# Patient Record
Sex: FEMALE | Race: WHITE
[De-identification: names, ages, dates, MRNs, and addresses within clinical notes are randomized per-mention and may not be internally consistent; named-entity substitution may affect disease eponyms.]

---

## 2018-01-09 ENCOUNTER — HOSPITAL ENCOUNTER (OUTPATIENT)
Dept: HOSPITAL 95 - LAB | Age: 83
Discharge: HOME | End: 2018-01-09
Attending: DERMATOLOGY
Payer: MEDICARE

## 2018-01-09 DIAGNOSIS — L03.116: Primary | ICD-10-CM

## 2018-02-02 ENCOUNTER — HOSPITAL ENCOUNTER (OUTPATIENT)
Dept: HOSPITAL 95 - WOUND | Age: 83
Discharge: HOME | End: 2018-02-02
Attending: SURGERY
Payer: MEDICARE

## 2018-02-02 DIAGNOSIS — I87.002: ICD-10-CM

## 2018-02-02 DIAGNOSIS — I87.2: ICD-10-CM

## 2018-02-02 DIAGNOSIS — I48.0: ICD-10-CM

## 2018-02-02 DIAGNOSIS — I10: ICD-10-CM

## 2018-02-02 DIAGNOSIS — L97.223: Primary | ICD-10-CM

## 2018-02-02 PROCEDURE — G0463 HOSPITAL OUTPT CLINIC VISIT: HCPCS

## 2018-02-02 PROCEDURE — 0KBT0ZZ EXCISION OF LEFT LOWER LEG MUSCLE, OPEN APPROACH: ICD-10-PCS | Performed by: SURGERY

## 2018-02-08 ENCOUNTER — HOSPITAL ENCOUNTER (OUTPATIENT)
Dept: HOSPITAL 95 - WOUND | Age: 83
Discharge: HOME | End: 2018-02-08
Attending: EMERGENCY MEDICINE
Payer: MEDICARE

## 2018-02-08 VITALS — HEIGHT: 70 IN | BODY MASS INDEX: 29.71 KG/M2 | WEIGHT: 207.5 LBS

## 2018-02-08 DIAGNOSIS — Z79.01: ICD-10-CM

## 2018-02-08 DIAGNOSIS — I87.002: ICD-10-CM

## 2018-02-08 DIAGNOSIS — Z86.718: ICD-10-CM

## 2018-02-08 DIAGNOSIS — Z48.00: Primary | ICD-10-CM

## 2018-02-08 DIAGNOSIS — I10: ICD-10-CM

## 2018-02-08 DIAGNOSIS — L97.223: ICD-10-CM

## 2018-02-08 DIAGNOSIS — I87.2: ICD-10-CM

## 2018-02-08 DIAGNOSIS — I48.0: ICD-10-CM

## 2018-02-08 DIAGNOSIS — F32.9: ICD-10-CM

## 2018-02-08 PROCEDURE — G0463 HOSPITAL OUTPT CLINIC VISIT: HCPCS

## 2018-02-12 ENCOUNTER — HOSPITAL ENCOUNTER (OUTPATIENT)
Dept: HOSPITAL 95 - WOUND | Age: 83
Discharge: HOME | End: 2018-02-12
Attending: ORTHOPAEDIC SURGERY
Payer: MEDICARE

## 2018-02-12 DIAGNOSIS — L97.223: ICD-10-CM

## 2018-02-12 DIAGNOSIS — I48.0: ICD-10-CM

## 2018-02-12 DIAGNOSIS — Z48.00: Primary | ICD-10-CM

## 2018-02-12 DIAGNOSIS — I10: ICD-10-CM

## 2018-02-12 DIAGNOSIS — I87.002: ICD-10-CM

## 2018-02-12 DIAGNOSIS — I87.2: ICD-10-CM

## 2018-02-12 PROCEDURE — 0HBLXZZ EXCISION OF LEFT LOWER LEG SKIN, EXTERNAL APPROACH: ICD-10-PCS | Performed by: ORTHOPAEDIC SURGERY

## 2018-02-21 ENCOUNTER — HOSPITAL ENCOUNTER (OUTPATIENT)
Dept: HOSPITAL 95 - WOUND | Age: 83
Discharge: HOME | End: 2018-02-21
Attending: SURGERY
Payer: MEDICARE

## 2018-02-21 DIAGNOSIS — I87.2: ICD-10-CM

## 2018-02-21 DIAGNOSIS — I87.002: ICD-10-CM

## 2018-02-21 DIAGNOSIS — Z48.00: Primary | ICD-10-CM

## 2018-02-21 DIAGNOSIS — I10: ICD-10-CM

## 2018-02-21 DIAGNOSIS — L97.223: ICD-10-CM

## 2018-02-21 DIAGNOSIS — I48.0: ICD-10-CM

## 2018-02-21 PROCEDURE — 2W1RX6Z COMPRESSION OF LEFT LOWER LEG USING PRESSURE DRESSING: ICD-10-PCS | Performed by: SURGERY

## 2018-02-21 PROCEDURE — 0HBLXZZ EXCISION OF LEFT LOWER LEG SKIN, EXTERNAL APPROACH: ICD-10-PCS | Performed by: SURGERY

## 2018-02-21 PROCEDURE — G0463 HOSPITAL OUTPT CLINIC VISIT: HCPCS

## 2018-02-27 ENCOUNTER — HOSPITAL ENCOUNTER (OUTPATIENT)
Dept: HOSPITAL 95 - LAB SHORT | Age: 83
Discharge: HOME | End: 2018-02-27
Payer: MEDICARE

## 2018-02-27 DIAGNOSIS — I87.2: Primary | ICD-10-CM

## 2018-02-27 DIAGNOSIS — L97.223: ICD-10-CM

## 2018-02-28 ENCOUNTER — HOSPITAL ENCOUNTER (OUTPATIENT)
Dept: HOSPITAL 95 - WOUND | Age: 83
Discharge: HOME | End: 2018-02-28
Attending: SURGERY
Payer: MEDICARE

## 2018-02-28 DIAGNOSIS — I10: ICD-10-CM

## 2018-02-28 DIAGNOSIS — I87.002: ICD-10-CM

## 2018-02-28 DIAGNOSIS — I87.2: ICD-10-CM

## 2018-02-28 DIAGNOSIS — Z79.01: ICD-10-CM

## 2018-02-28 DIAGNOSIS — I48.0: ICD-10-CM

## 2018-02-28 DIAGNOSIS — L97.223: Primary | ICD-10-CM

## 2018-02-28 PROCEDURE — 0HBLXZZ EXCISION OF LEFT LOWER LEG SKIN, EXTERNAL APPROACH: ICD-10-PCS | Performed by: SURGERY

## 2018-02-28 PROCEDURE — G0463 HOSPITAL OUTPT CLINIC VISIT: HCPCS

## 2018-03-07 ENCOUNTER — HOSPITAL ENCOUNTER (OUTPATIENT)
Dept: HOSPITAL 95 - WOUND | Age: 83
Discharge: HOME | End: 2018-03-07
Attending: SURGERY
Payer: MEDICARE

## 2018-03-07 DIAGNOSIS — I87.2: Primary | ICD-10-CM

## 2018-03-07 DIAGNOSIS — L97.223: ICD-10-CM

## 2018-03-07 DIAGNOSIS — Z79.01: ICD-10-CM

## 2018-03-07 DIAGNOSIS — I10: ICD-10-CM

## 2018-03-07 DIAGNOSIS — I87.002: ICD-10-CM

## 2018-03-07 DIAGNOSIS — I48.0: ICD-10-CM

## 2018-03-07 PROCEDURE — 2W1RX6Z COMPRESSION OF LEFT LOWER LEG USING PRESSURE DRESSING: ICD-10-PCS | Performed by: SURGERY

## 2018-03-14 ENCOUNTER — HOSPITAL ENCOUNTER (OUTPATIENT)
Dept: HOSPITAL 95 - WOUND | Age: 83
Discharge: HOME | End: 2018-03-14
Attending: SURGERY
Payer: MEDICARE

## 2018-03-14 DIAGNOSIS — Z86.718: ICD-10-CM

## 2018-03-14 DIAGNOSIS — I10: ICD-10-CM

## 2018-03-14 DIAGNOSIS — Z79.01: ICD-10-CM

## 2018-03-14 DIAGNOSIS — L97.223: ICD-10-CM

## 2018-03-14 DIAGNOSIS — I87.002: ICD-10-CM

## 2018-03-14 DIAGNOSIS — I87.2: Primary | ICD-10-CM

## 2018-03-14 DIAGNOSIS — I48.0: ICD-10-CM

## 2018-03-14 PROCEDURE — 2W1RX6Z COMPRESSION OF LEFT LOWER LEG USING PRESSURE DRESSING: ICD-10-PCS | Performed by: SURGERY

## 2018-03-23 ENCOUNTER — HOSPITAL ENCOUNTER (OUTPATIENT)
Dept: HOSPITAL 95 - WOUND | Age: 83
Discharge: HOME | End: 2018-03-23
Attending: SURGERY
Payer: MEDICARE

## 2018-03-23 DIAGNOSIS — I87.2: ICD-10-CM

## 2018-03-23 DIAGNOSIS — I10: ICD-10-CM

## 2018-03-23 DIAGNOSIS — I87.002: ICD-10-CM

## 2018-03-23 DIAGNOSIS — L97.822: ICD-10-CM

## 2018-03-23 DIAGNOSIS — I82.512: ICD-10-CM

## 2018-03-23 DIAGNOSIS — Z90.710: ICD-10-CM

## 2018-03-23 DIAGNOSIS — I48.0: ICD-10-CM

## 2018-03-23 DIAGNOSIS — F32.9: ICD-10-CM

## 2018-03-23 DIAGNOSIS — Z79.01: ICD-10-CM

## 2018-03-23 DIAGNOSIS — Z48.00: Primary | ICD-10-CM

## 2018-03-23 PROCEDURE — 0JBP0ZZ EXCISION OF LEFT LOWER LEG SUBCUTANEOUS TISSUE AND FASCIA, OPEN APPROACH: ICD-10-PCS | Performed by: ORTHOPAEDIC SURGERY

## 2018-03-28 ENCOUNTER — HOSPITAL ENCOUNTER (OUTPATIENT)
Dept: HOSPITAL 95 - WOUND | Age: 83
Discharge: HOME | End: 2018-03-28
Attending: SURGERY
Payer: MEDICARE

## 2018-03-28 DIAGNOSIS — I48.0: ICD-10-CM

## 2018-03-28 DIAGNOSIS — I10: ICD-10-CM

## 2018-03-28 DIAGNOSIS — F32.9: ICD-10-CM

## 2018-03-28 DIAGNOSIS — I87.2: Primary | ICD-10-CM

## 2018-03-28 DIAGNOSIS — L97.223: ICD-10-CM

## 2018-03-28 DIAGNOSIS — Z86.718: ICD-10-CM

## 2018-03-28 DIAGNOSIS — I87.002: ICD-10-CM

## 2018-03-28 DIAGNOSIS — F03.90: ICD-10-CM

## 2018-03-28 DIAGNOSIS — Z79.01: ICD-10-CM

## 2018-03-28 PROCEDURE — G0463 HOSPITAL OUTPT CLINIC VISIT: HCPCS

## 2018-03-28 PROCEDURE — 0HBLXZZ EXCISION OF LEFT LOWER LEG SKIN, EXTERNAL APPROACH: ICD-10-PCS | Performed by: SURGERY

## 2018-03-31 ENCOUNTER — HOSPITAL ENCOUNTER (OUTPATIENT)
Dept: HOSPITAL 95 - ER | Age: 83
Setting detail: OBSERVATION
LOS: 2 days | Discharge: HOME HEALTH | End: 2018-04-02
Attending: HOSPITALIST | Admitting: HOSPITALIST
Payer: MEDICARE

## 2018-03-31 VITALS — WEIGHT: 198.42 LBS | HEIGHT: 70 IN | BODY MASS INDEX: 28.41 KG/M2

## 2018-03-31 DIAGNOSIS — Z88.8: ICD-10-CM

## 2018-03-31 DIAGNOSIS — Z88.1: ICD-10-CM

## 2018-03-31 DIAGNOSIS — Z88.0: ICD-10-CM

## 2018-03-31 DIAGNOSIS — F41.9: ICD-10-CM

## 2018-03-31 DIAGNOSIS — S80.12XA: ICD-10-CM

## 2018-03-31 DIAGNOSIS — Z88.6: ICD-10-CM

## 2018-03-31 DIAGNOSIS — Z79.899: ICD-10-CM
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
## 2018-03-31 DIAGNOSIS — E03.9: ICD-10-CM

## 2018-03-31 DIAGNOSIS — F03.90: ICD-10-CM

## 2018-03-31 DIAGNOSIS — S70.02XA: Primary | ICD-10-CM

## 2018-03-31 DIAGNOSIS — L97.329: ICD-10-CM

## 2018-03-31 DIAGNOSIS — Z88.2: ICD-10-CM

## 2018-03-31 DIAGNOSIS — I87.2: ICD-10-CM

## 2018-03-31 DIAGNOSIS — S30.0XXA: ICD-10-CM

## 2018-03-31 DIAGNOSIS — Z91.041: ICD-10-CM

## 2018-03-31 DIAGNOSIS — Z79.01: ICD-10-CM

## 2018-03-31 DIAGNOSIS — R41.9: ICD-10-CM

## 2018-03-31 PROCEDURE — G8988 SELF CARE GOAL STATUS: HCPCS

## 2018-03-31 PROCEDURE — G8978 MOBILITY CURRENT STATUS: HCPCS

## 2018-03-31 PROCEDURE — G8979 MOBILITY GOAL STATUS: HCPCS

## 2018-03-31 PROCEDURE — G0378 HOSPITAL OBSERVATION PER HR: HCPCS

## 2018-03-31 PROCEDURE — G8987 SELF CARE CURRENT STATUS: HCPCS

## 2018-04-01 LAB
ALBUMIN SERPL BCP-MCNC: 3.2 G/DL (ref 3.4–5)
ALBUMIN/GLOB SERPL: 0.9 {RATIO} (ref 0.8–1.8)
ALT SERPL W P-5'-P-CCNC: 19 U/L (ref 12–78)
ANION GAP SERPL CALCULATED.4IONS-SCNC: 7 MMOL/L (ref 6–16)
AST SERPL W P-5'-P-CCNC: 16 U/L (ref 12–37)
BASOPHILS # BLD AUTO: 0.02 K/MM3 (ref 0–0.23)
BASOPHILS NFR BLD AUTO: 0 % (ref 0–2)
BILIRUB SERPL-MCNC: 0.4 MG/DL (ref 0.1–1)
BUN SERPL-MCNC: 21 MG/DL (ref 8–24)
CALCIUM SERPL-MCNC: 8 MG/DL (ref 8.5–10.1)
CHLORIDE SERPL-SCNC: 107 MMOL/L (ref 98–108)
CO2 SERPL-SCNC: 28 MMOL/L (ref 21–32)
CREAT SERPL-MCNC: 0.64 MG/DL (ref 0.4–1)
DEPRECATED RDW RBC AUTO: 47.4 FL (ref 35.1–46.3)
EOSINOPHIL # BLD AUTO: 0.23 K/MM3 (ref 0–0.68)
EOSINOPHIL NFR BLD AUTO: 5 % (ref 0–6)
ERYTHROCYTE [DISTWIDTH] IN BLOOD BY AUTOMATED COUNT: 14.1 % (ref 11.7–14.2)
GLOBULIN SER CALC-MCNC: 3.4 G/DL (ref 2.2–4)
GLUCOSE SERPL-MCNC: 88 MG/DL (ref 70–99)
HCT VFR BLD AUTO: 32.1 % (ref 33–51)
HGB BLD-MCNC: 10.1 G/DL (ref 11.5–16)
IMM GRANULOCYTES # BLD AUTO: 0.01 K/MM3 (ref 0–0.1)
IMM GRANULOCYTES NFR BLD AUTO: 0 % (ref 0–1)
LYMPHOCYTES # BLD AUTO: 1.25 K/MM3 (ref 0.84–5.2)
LYMPHOCYTES NFR BLD AUTO: 26 % (ref 21–46)
MCHC RBC AUTO-ENTMCNC: 31.5 G/DL (ref 31.5–36.5)
MCV RBC AUTO: 91 FL (ref 80–100)
MONOCYTES # BLD AUTO: 0.41 K/MM3 (ref 0.16–1.47)
MONOCYTES NFR BLD AUTO: 9 % (ref 4–13)
NEUTROPHILS # BLD AUTO: 2.83 K/MM3 (ref 1.96–9.15)
NEUTROPHILS NFR BLD AUTO: 60 % (ref 41–73)
NRBC # BLD AUTO: 0 K/MM3 (ref 0–0.02)
NRBC BLD AUTO-RTO: 0 /100 WBC (ref 0–0.2)
PLATELET # BLD AUTO: 200 K/MM3 (ref 150–400)
POTASSIUM SERPL-SCNC: 3.7 MMOL/L (ref 3.5–5.5)
PROT SERPL-MCNC: 6.6 G/DL (ref 6.4–8.2)
PROTHROMBIN TIME: 19.1 SEC (ref 9.7–11.5)
RBC #/AREA URNS HPF: (no result) /HPF (ref 0–2)
SODIUM SERPL-SCNC: 142 MMOL/L (ref 136–145)
SP GR SPEC: 1.02 (ref 1–1.02)
UROBILINOGEN UR STRIP-MCNC: (no result) MG/DL

## 2018-04-02 LAB
ANION GAP SERPL CALCULATED.4IONS-SCNC: 6 MMOL/L (ref 6–16)
BASOPHILS # BLD AUTO: 0.02 K/MM3 (ref 0–0.23)
BASOPHILS NFR BLD AUTO: 1 % (ref 0–2)
BUN SERPL-MCNC: 30 MG/DL (ref 8–24)
CALCIUM SERPL-MCNC: 8.1 MG/DL (ref 8.5–10.1)
CHLORIDE SERPL-SCNC: 107 MMOL/L (ref 98–108)
CO2 SERPL-SCNC: 28 MMOL/L (ref 21–32)
CREAT SERPL-MCNC: 0.85 MG/DL (ref 0.4–1)
DEPRECATED RDW RBC AUTO: 46.9 FL (ref 35.1–46.3)
EOSINOPHIL # BLD AUTO: 0.24 K/MM3 (ref 0–0.68)
EOSINOPHIL NFR BLD AUTO: 6 % (ref 0–6)
ERYTHROCYTE [DISTWIDTH] IN BLOOD BY AUTOMATED COUNT: 14.1 % (ref 11.7–14.2)
GLUCOSE SERPL-MCNC: 103 MG/DL (ref 70–99)
HCT VFR BLD AUTO: 32.4 % (ref 33–51)
HGB BLD-MCNC: 10.4 G/DL (ref 11.5–16)
IMM GRANULOCYTES # BLD AUTO: 0.01 K/MM3 (ref 0–0.1)
IMM GRANULOCYTES NFR BLD AUTO: 0 % (ref 0–1)
LYMPHOCYTES # BLD AUTO: 0.92 K/MM3 (ref 0.84–5.2)
LYMPHOCYTES NFR BLD AUTO: 22 % (ref 21–46)
MCHC RBC AUTO-ENTMCNC: 32.1 G/DL (ref 31.5–36.5)
MCV RBC AUTO: 91 FL (ref 80–100)
MONOCYTES # BLD AUTO: 0.37 K/MM3 (ref 0.16–1.47)
MONOCYTES NFR BLD AUTO: 9 % (ref 4–13)
NEUTROPHILS # BLD AUTO: 2.57 K/MM3 (ref 1.96–9.15)
NEUTROPHILS NFR BLD AUTO: 62 % (ref 41–73)
NRBC # BLD AUTO: 0 K/MM3 (ref 0–0.02)
NRBC BLD AUTO-RTO: 0 /100 WBC (ref 0–0.2)
PLATELET # BLD AUTO: 191 K/MM3 (ref 150–400)
POTASSIUM SERPL-SCNC: 4 MMOL/L (ref 3.5–5.5)
PROTHROMBIN TIME: 18.7 SEC (ref 9.7–11.5)
SODIUM SERPL-SCNC: 141 MMOL/L (ref 136–145)

## 2018-04-04 ENCOUNTER — HOSPITAL ENCOUNTER (OUTPATIENT)
Dept: HOSPITAL 95 - WOUND | Age: 83
Discharge: HOME | End: 2018-04-04
Attending: SURGERY
Payer: MEDICARE

## 2018-04-04 DIAGNOSIS — Z79.01: ICD-10-CM

## 2018-04-04 DIAGNOSIS — I87.002: ICD-10-CM

## 2018-04-04 DIAGNOSIS — I10: ICD-10-CM

## 2018-04-04 DIAGNOSIS — Z48.00: Primary | ICD-10-CM

## 2018-04-04 DIAGNOSIS — I87.2: ICD-10-CM

## 2018-04-04 DIAGNOSIS — I48.0: ICD-10-CM

## 2018-04-04 DIAGNOSIS — L97.223: ICD-10-CM

## 2018-04-04 PROCEDURE — G0463 HOSPITAL OUTPT CLINIC VISIT: HCPCS

## 2018-04-09 ENCOUNTER — HOSPITAL ENCOUNTER (OUTPATIENT)
Dept: HOSPITAL 95 - WOUND | Age: 83
Discharge: HOME | End: 2018-04-09
Attending: ORTHOPAEDIC SURGERY
Payer: MEDICARE

## 2018-04-09 ENCOUNTER — HOSPITAL ENCOUNTER (EMERGENCY)
Dept: HOSPITAL 95 - ER | Age: 83
Discharge: HOME | End: 2018-04-09
Payer: MEDICARE

## 2018-04-09 VITALS — HEIGHT: 70 IN | WEIGHT: 200 LBS | BODY MASS INDEX: 28.63 KG/M2

## 2018-04-09 DIAGNOSIS — Z79.899: ICD-10-CM

## 2018-04-09 DIAGNOSIS — I87.002: ICD-10-CM

## 2018-04-09 DIAGNOSIS — I87.2: Primary | ICD-10-CM

## 2018-04-09 DIAGNOSIS — I10: ICD-10-CM

## 2018-04-09 DIAGNOSIS — Z79.01: ICD-10-CM

## 2018-04-09 DIAGNOSIS — T14.8XXA: Primary | ICD-10-CM

## 2018-04-09 DIAGNOSIS — Z88.0: ICD-10-CM
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
## 2018-04-09 DIAGNOSIS — Z86.718: ICD-10-CM

## 2018-04-09 DIAGNOSIS — I48.0: ICD-10-CM

## 2018-04-09 DIAGNOSIS — Z88.6: ICD-10-CM

## 2018-04-09 DIAGNOSIS — Z88.2: ICD-10-CM

## 2018-04-09 DIAGNOSIS — L97.223: ICD-10-CM

## 2018-04-09 DIAGNOSIS — Z88.8: ICD-10-CM

## 2018-04-09 DIAGNOSIS — Z88.1: ICD-10-CM

## 2018-04-09 DIAGNOSIS — L97.329: ICD-10-CM

## 2018-04-09 DIAGNOSIS — I48.91: ICD-10-CM

## 2018-04-09 PROCEDURE — 2W1MX6Z COMPRESSION OF LEFT LOWER EXTREMITY USING PRESSURE DRESSING: ICD-10-PCS | Performed by: ORTHOPAEDIC SURGERY

## 2018-04-09 PROCEDURE — 0HBLXZZ EXCISION OF LEFT LOWER LEG SKIN, EXTERNAL APPROACH: ICD-10-PCS | Performed by: ORTHOPAEDIC SURGERY

## 2018-04-23 ENCOUNTER — HOSPITAL ENCOUNTER (OUTPATIENT)
Dept: HOSPITAL 95 - WOUND | Age: 83
Discharge: HOME | End: 2018-04-23
Attending: ORTHOPAEDIC SURGERY
Payer: MEDICARE

## 2018-04-23 DIAGNOSIS — I10: ICD-10-CM

## 2018-04-23 DIAGNOSIS — F32.9: ICD-10-CM

## 2018-04-23 DIAGNOSIS — I87.2: ICD-10-CM

## 2018-04-23 DIAGNOSIS — L97.223: ICD-10-CM

## 2018-04-23 DIAGNOSIS — Z79.02: ICD-10-CM

## 2018-04-23 DIAGNOSIS — L97.822: Primary | ICD-10-CM

## 2018-04-23 DIAGNOSIS — I87.002: ICD-10-CM

## 2018-04-23 DIAGNOSIS — I48.0: ICD-10-CM

## 2018-04-23 PROCEDURE — 2W1RX6Z COMPRESSION OF LEFT LOWER LEG USING PRESSURE DRESSING: ICD-10-PCS | Performed by: ORTHOPAEDIC SURGERY

## 2018-04-23 PROCEDURE — G0463 HOSPITAL OUTPT CLINIC VISIT: HCPCS

## 2018-04-30 ENCOUNTER — HOSPITAL ENCOUNTER (OUTPATIENT)
Dept: HOSPITAL 95 - WOUND | Age: 83
Discharge: HOME | End: 2018-04-30
Attending: ORTHOPAEDIC SURGERY
Payer: MEDICARE

## 2018-04-30 DIAGNOSIS — I87.2: ICD-10-CM

## 2018-04-30 DIAGNOSIS — L97.822: Primary | ICD-10-CM

## 2018-04-30 DIAGNOSIS — L97.223: ICD-10-CM

## 2018-04-30 DIAGNOSIS — I10: ICD-10-CM

## 2018-04-30 DIAGNOSIS — I48.0: ICD-10-CM

## 2018-04-30 DIAGNOSIS — Z79.01: ICD-10-CM

## 2018-04-30 PROCEDURE — 2W1RX6Z COMPRESSION OF LEFT LOWER LEG USING PRESSURE DRESSING: ICD-10-PCS | Performed by: ORTHOPAEDIC SURGERY

## 2018-04-30 PROCEDURE — G0463 HOSPITAL OUTPT CLINIC VISIT: HCPCS

## 2018-05-07 ENCOUNTER — HOSPITAL ENCOUNTER (OUTPATIENT)
Dept: HOSPITAL 95 - WOUND | Age: 83
Discharge: HOME | End: 2018-05-07
Attending: ORTHOPAEDIC SURGERY
Payer: MEDICARE

## 2018-05-07 DIAGNOSIS — I87.002: ICD-10-CM

## 2018-05-07 DIAGNOSIS — I87.2: Primary | ICD-10-CM

## 2018-05-07 DIAGNOSIS — I10: ICD-10-CM

## 2018-05-07 DIAGNOSIS — I48.0: ICD-10-CM

## 2018-05-07 DIAGNOSIS — Z79.01: ICD-10-CM

## 2018-05-07 DIAGNOSIS — L97.822: ICD-10-CM

## 2018-05-07 PROCEDURE — 0HBLXZZ EXCISION OF LEFT LOWER LEG SKIN, EXTERNAL APPROACH: ICD-10-PCS | Performed by: ORTHOPAEDIC SURGERY

## 2018-05-07 PROCEDURE — 2W1MX6Z COMPRESSION OF LEFT LOWER EXTREMITY USING PRESSURE DRESSING: ICD-10-PCS | Performed by: ORTHOPAEDIC SURGERY

## 2018-05-07 PROCEDURE — G0463 HOSPITAL OUTPT CLINIC VISIT: HCPCS

## 2018-05-14 ENCOUNTER — HOSPITAL ENCOUNTER (OUTPATIENT)
Dept: HOSPITAL 95 - WOUND | Age: 83
Discharge: HOME | End: 2018-05-14
Attending: ORTHOPAEDIC SURGERY
Payer: MEDICARE

## 2018-05-14 DIAGNOSIS — I10: ICD-10-CM

## 2018-05-14 DIAGNOSIS — I87.2: Primary | ICD-10-CM

## 2018-05-14 DIAGNOSIS — L97.822: ICD-10-CM

## 2018-05-14 DIAGNOSIS — I48.0: ICD-10-CM

## 2018-05-14 DIAGNOSIS — Z79.01: ICD-10-CM

## 2018-05-14 DIAGNOSIS — L97.223: ICD-10-CM

## 2018-05-14 PROCEDURE — G0463 HOSPITAL OUTPT CLINIC VISIT: HCPCS

## 2018-05-14 PROCEDURE — 2W1RX6Z COMPRESSION OF LEFT LOWER LEG USING PRESSURE DRESSING: ICD-10-PCS | Performed by: ORTHOPAEDIC SURGERY

## 2018-05-21 ENCOUNTER — HOSPITAL ENCOUNTER (OUTPATIENT)
Dept: HOSPITAL 95 - WOUND | Age: 83
Discharge: HOME | End: 2018-05-21
Attending: ORTHOPAEDIC SURGERY
Payer: MEDICARE

## 2018-05-21 DIAGNOSIS — I48.0: ICD-10-CM

## 2018-05-21 DIAGNOSIS — I10: ICD-10-CM

## 2018-05-21 DIAGNOSIS — Z79.01: ICD-10-CM

## 2018-05-21 DIAGNOSIS — F32.9: ICD-10-CM

## 2018-05-21 DIAGNOSIS — L97.822: Primary | ICD-10-CM

## 2018-05-21 PROCEDURE — 0HBLXZZ EXCISION OF LEFT LOWER LEG SKIN, EXTERNAL APPROACH: ICD-10-PCS | Performed by: ORTHOPAEDIC SURGERY

## 2018-06-04 ENCOUNTER — HOSPITAL ENCOUNTER (OUTPATIENT)
Dept: HOSPITAL 95 - WOUND | Age: 83
Discharge: HOME | End: 2018-06-04
Attending: ORTHOPAEDIC SURGERY
Payer: MEDICARE

## 2018-06-04 DIAGNOSIS — Z86.718: ICD-10-CM

## 2018-06-04 DIAGNOSIS — L97.822: Primary | ICD-10-CM

## 2018-06-04 DIAGNOSIS — I48.0: ICD-10-CM

## 2018-06-04 DIAGNOSIS — I10: ICD-10-CM

## 2018-06-04 DIAGNOSIS — I87.2: ICD-10-CM

## 2018-06-04 DIAGNOSIS — F32.9: ICD-10-CM

## 2018-06-04 DIAGNOSIS — Z79.01: ICD-10-CM

## 2018-06-04 PROCEDURE — 2W1RX6Z COMPRESSION OF LEFT LOWER LEG USING PRESSURE DRESSING: ICD-10-PCS | Performed by: ORTHOPAEDIC SURGERY

## 2018-06-04 PROCEDURE — 0HBLXZZ EXCISION OF LEFT LOWER LEG SKIN, EXTERNAL APPROACH: ICD-10-PCS | Performed by: ORTHOPAEDIC SURGERY

## 2018-06-04 PROCEDURE — G0463 HOSPITAL OUTPT CLINIC VISIT: HCPCS

## 2018-06-11 ENCOUNTER — HOSPITAL ENCOUNTER (OUTPATIENT)
Dept: HOSPITAL 95 - WOUND | Age: 83
Discharge: HOME | End: 2018-06-11
Attending: ORTHOPAEDIC SURGERY
Payer: MEDICARE

## 2018-06-11 DIAGNOSIS — L97.829: ICD-10-CM

## 2018-06-11 DIAGNOSIS — I10: ICD-10-CM

## 2018-06-11 DIAGNOSIS — I87.2: Primary | ICD-10-CM

## 2018-06-11 DIAGNOSIS — I48.0: ICD-10-CM

## 2018-06-11 DIAGNOSIS — I87.002: ICD-10-CM

## 2018-06-11 DIAGNOSIS — Z79.01: ICD-10-CM

## 2018-06-11 PROCEDURE — 0HBLXZZ EXCISION OF LEFT LOWER LEG SKIN, EXTERNAL APPROACH: ICD-10-PCS | Performed by: ORTHOPAEDIC SURGERY

## 2018-06-11 PROCEDURE — 2W1RX6Z COMPRESSION OF LEFT LOWER LEG USING PRESSURE DRESSING: ICD-10-PCS | Performed by: ORTHOPAEDIC SURGERY

## 2018-06-25 ENCOUNTER — HOSPITAL ENCOUNTER (OUTPATIENT)
Dept: HOSPITAL 95 - WOUND | Age: 83
Discharge: HOME | End: 2018-06-25
Attending: ORTHOPAEDIC SURGERY
Payer: MEDICARE

## 2018-06-25 DIAGNOSIS — I87.2: ICD-10-CM

## 2018-06-25 DIAGNOSIS — Z79.01: ICD-10-CM

## 2018-06-25 DIAGNOSIS — I10: ICD-10-CM

## 2018-06-25 DIAGNOSIS — I87.002: ICD-10-CM

## 2018-06-25 DIAGNOSIS — L97.822: Primary | ICD-10-CM

## 2018-06-25 DIAGNOSIS — F32.9: ICD-10-CM

## 2018-06-25 DIAGNOSIS — Z86.718: ICD-10-CM

## 2018-06-25 DIAGNOSIS — I48.0: ICD-10-CM

## 2018-06-25 PROCEDURE — 2W1MX6Z COMPRESSION OF LEFT LOWER EXTREMITY USING PRESSURE DRESSING: ICD-10-PCS | Performed by: ORTHOPAEDIC SURGERY

## 2018-06-25 PROCEDURE — 0HBLXZZ EXCISION OF LEFT LOWER LEG SKIN, EXTERNAL APPROACH: ICD-10-PCS | Performed by: ORTHOPAEDIC SURGERY

## 2018-07-09 ENCOUNTER — HOSPITAL ENCOUNTER (OUTPATIENT)
Dept: HOSPITAL 95 - WOUND | Age: 83
Discharge: HOME | End: 2018-07-09
Attending: ORTHOPAEDIC SURGERY
Payer: MEDICARE

## 2018-07-09 DIAGNOSIS — I48.0: ICD-10-CM

## 2018-07-09 DIAGNOSIS — Z79.01: ICD-10-CM

## 2018-07-09 DIAGNOSIS — L97.822: Primary | ICD-10-CM

## 2018-07-09 DIAGNOSIS — R60.0: ICD-10-CM

## 2018-07-09 DIAGNOSIS — F32.9: ICD-10-CM

## 2018-07-09 DIAGNOSIS — I10: ICD-10-CM

## 2018-07-09 DIAGNOSIS — L97.223: ICD-10-CM

## 2018-07-09 DIAGNOSIS — I87.2: ICD-10-CM

## 2018-07-09 DIAGNOSIS — L84: ICD-10-CM

## 2018-07-09 DIAGNOSIS — I87.002: ICD-10-CM

## 2018-07-09 DIAGNOSIS — Z86.718: ICD-10-CM

## 2018-07-09 PROCEDURE — G0463 HOSPITAL OUTPT CLINIC VISIT: HCPCS

## 2018-07-16 ENCOUNTER — HOSPITAL ENCOUNTER (OUTPATIENT)
Dept: HOSPITAL 95 - WOUND | Age: 83
Discharge: HOME | End: 2018-07-16
Attending: ORTHOPAEDIC SURGERY
Payer: MEDICARE

## 2018-07-16 DIAGNOSIS — Z48.00: Primary | ICD-10-CM

## 2018-07-16 DIAGNOSIS — I10: ICD-10-CM

## 2018-07-16 DIAGNOSIS — Z79.01: ICD-10-CM

## 2018-07-16 DIAGNOSIS — I87.2: ICD-10-CM

## 2018-07-16 DIAGNOSIS — F32.9: ICD-10-CM

## 2018-07-16 DIAGNOSIS — I48.0: ICD-10-CM

## 2018-07-16 DIAGNOSIS — I87.002: ICD-10-CM

## 2018-07-16 DIAGNOSIS — Z86.718: ICD-10-CM

## 2018-07-16 DIAGNOSIS — L97.822: ICD-10-CM

## 2018-07-16 PROCEDURE — 2W1RX6Z COMPRESSION OF LEFT LOWER LEG USING PRESSURE DRESSING: ICD-10-PCS | Performed by: ORTHOPAEDIC SURGERY

## 2018-07-16 PROCEDURE — G0463 HOSPITAL OUTPT CLINIC VISIT: HCPCS

## 2018-07-16 PROCEDURE — 0JBP0ZZ EXCISION OF LEFT LOWER LEG SUBCUTANEOUS TISSUE AND FASCIA, OPEN APPROACH: ICD-10-PCS | Performed by: ORTHOPAEDIC SURGERY

## 2018-07-18 ENCOUNTER — HOSPITAL ENCOUNTER (OUTPATIENT)
Dept: HOSPITAL 95 - WOUND | Age: 83
Discharge: HOME | End: 2018-07-18
Attending: ORTHOPAEDIC SURGERY
Payer: MEDICARE

## 2018-07-18 DIAGNOSIS — I10: ICD-10-CM

## 2018-07-18 DIAGNOSIS — L97.223: ICD-10-CM

## 2018-07-18 DIAGNOSIS — I87.2: Primary | ICD-10-CM

## 2018-07-18 DIAGNOSIS — I48.0: ICD-10-CM

## 2018-07-18 PROCEDURE — 2W1RX6Z COMPRESSION OF LEFT LOWER LEG USING PRESSURE DRESSING: ICD-10-PCS | Performed by: ORTHOPAEDIC SURGERY

## 2018-08-07 ENCOUNTER — HOSPITAL ENCOUNTER (OUTPATIENT)
Dept: HOSPITAL 95 - MHTC | Age: 83
Discharge: HOME | End: 2018-08-07
Attending: INTERNAL MEDICINE
Payer: MEDICARE

## 2018-08-07 VITALS — BODY MASS INDEX: 28.41 KG/M2 | WEIGHT: 198.42 LBS | HEIGHT: 70 IN

## 2018-08-07 DIAGNOSIS — I83.029: Primary | ICD-10-CM

## 2018-08-07 DIAGNOSIS — E03.9: ICD-10-CM

## 2018-08-07 DIAGNOSIS — L97.929: ICD-10-CM

## 2018-08-07 DIAGNOSIS — I87.2: ICD-10-CM

## 2018-08-07 PROCEDURE — 3E033TZ INTRODUCTION OF DESTRUCTIVE AGENT INTO PERIPHERAL VEIN, PERCUTANEOUS APPROACH: ICD-10-PCS | Performed by: INTERNAL MEDICINE

## 2018-08-22 ENCOUNTER — HOSPITAL ENCOUNTER (OUTPATIENT)
Dept: HOSPITAL 95 - LAB | Age: 83
End: 2018-08-22
Attending: INTERNAL MEDICINE
Payer: MEDICARE

## 2018-08-22 DIAGNOSIS — E78.5: Primary | ICD-10-CM

## 2018-08-22 DIAGNOSIS — R73.01: ICD-10-CM

## 2018-08-22 DIAGNOSIS — E03.9: ICD-10-CM

## 2018-08-22 DIAGNOSIS — E78.4: ICD-10-CM

## 2018-08-22 DIAGNOSIS — I48.2: ICD-10-CM

## 2018-08-22 DIAGNOSIS — E03.8: ICD-10-CM

## 2018-08-22 DIAGNOSIS — I10: ICD-10-CM

## 2018-08-22 LAB
ALBUMIN SERPL BCP-MCNC: 3.8 G/DL (ref 3.4–5)
ALBUMIN/GLOB SERPL: 1.2 {RATIO} (ref 0.8–1.8)
ALT SERPL W P-5'-P-CCNC: 19 U/L (ref 12–78)
ANION GAP SERPL CALCULATED.4IONS-SCNC: 7 MMOL/L (ref 6–16)
AST SERPL W P-5'-P-CCNC: 14 U/L (ref 12–37)
BASOPHILS # BLD AUTO: 0.01 K/MM3 (ref 0–0.23)
BASOPHILS NFR BLD AUTO: 0 % (ref 0–2)
BILIRUB SERPL-MCNC: 0.4 MG/DL (ref 0.1–1)
BUN SERPL-MCNC: 22 MG/DL (ref 8–24)
CALCIUM SERPL-MCNC: 8 MG/DL (ref 8.5–10.1)
CHLORIDE SERPL-SCNC: 106 MMOL/L (ref 98–108)
CHOLEST SERPL-MCNC: 149 MG/DL (ref 50–200)
CHOLEST/HDLC SERPL: 3.3 {RATIO}
CO2 SERPL-SCNC: 28 MMOL/L (ref 21–32)
CREAT SERPL-MCNC: 0.71 MG/DL (ref 0.4–1)
DEPRECATED RDW RBC AUTO: 47.4 FL (ref 35.1–46.3)
EOSINOPHIL # BLD AUTO: 0.21 K/MM3 (ref 0–0.68)
EOSINOPHIL NFR BLD AUTO: 5 % (ref 0–6)
ERYTHROCYTE [DISTWIDTH] IN BLOOD BY AUTOMATED COUNT: 13.9 % (ref 11.7–14.2)
GLOBULIN SER CALC-MCNC: 3.1 G/DL (ref 2.2–4)
GLUCOSE SERPL-MCNC: 89 MG/DL (ref 70–99)
HCT VFR BLD AUTO: 32.9 % (ref 33–51)
HDLC SERPL-MCNC: 45 MG/DL (ref 39–?)
HGB BLD-MCNC: 10.5 G/DL (ref 11.5–16)
IMM GRANULOCYTES # BLD AUTO: 0.01 K/MM3 (ref 0–0.1)
IMM GRANULOCYTES NFR BLD AUTO: 0 % (ref 0–1)
LDLC SERPL CALC-MCNC: 85 MG/DL (ref 0–110)
LDLC/HDLC SERPL: 1.9 {RATIO}
LYMPHOCYTES # BLD AUTO: 0.79 K/MM3 (ref 0.84–5.2)
LYMPHOCYTES NFR BLD AUTO: 20 % (ref 21–46)
MCHC RBC AUTO-ENTMCNC: 31.9 G/DL (ref 31.5–36.5)
MCV RBC AUTO: 94 FL (ref 80–100)
MONOCYTES # BLD AUTO: 0.25 K/MM3 (ref 0.16–1.47)
MONOCYTES NFR BLD AUTO: 6 % (ref 4–13)
NEUTROPHILS # BLD AUTO: 2.66 K/MM3 (ref 1.96–9.15)
NEUTROPHILS NFR BLD AUTO: 68 % (ref 41–73)
NRBC # BLD AUTO: 0 K/MM3 (ref 0–0.02)
NRBC BLD AUTO-RTO: 0 /100 WBC (ref 0–0.2)
PLATELET # BLD AUTO: 211 K/MM3 (ref 150–400)
POTASSIUM SERPL-SCNC: 3.4 MMOL/L (ref 3.5–5.5)
PROT SERPL-MCNC: 6.9 G/DL (ref 6.4–8.2)
SODIUM SERPL-SCNC: 141 MMOL/L (ref 136–145)
TRIGL SERPL-MCNC: 96 MG/DL (ref 30–160)
TSH SERPL DL<=0.005 MIU/L-ACNC: 2.93 UIU/ML (ref 0.36–4.8)
VLDLC SERPL CALC-MCNC: 19 MG/DL (ref 6–32)

## 2018-08-30 ENCOUNTER — HOSPITAL ENCOUNTER (EMERGENCY)
Dept: HOSPITAL 95 - ER | Age: 83
LOS: 1 days | Discharge: HOME | End: 2018-08-31
Payer: MEDICARE

## 2018-08-30 VITALS — WEIGHT: 180.01 LBS | HEIGHT: 64 IN | BODY MASS INDEX: 30.73 KG/M2

## 2018-08-30 DIAGNOSIS — Z88.0: ICD-10-CM

## 2018-08-30 DIAGNOSIS — L03.115: Primary | ICD-10-CM

## 2018-08-30 DIAGNOSIS — Z79.01: ICD-10-CM

## 2018-08-30 DIAGNOSIS — Z88.1: ICD-10-CM

## 2018-08-30 DIAGNOSIS — Z88.6: ICD-10-CM

## 2018-08-30 DIAGNOSIS — R79.1: ICD-10-CM

## 2018-08-30 DIAGNOSIS — I48.91: ICD-10-CM

## 2018-08-30 DIAGNOSIS — Z88.2: ICD-10-CM

## 2018-08-30 DIAGNOSIS — Z79.899: ICD-10-CM

## 2018-08-30 DIAGNOSIS — Z88.8: ICD-10-CM

## 2018-08-30 LAB
ANION GAP SERPL CALCULATED.4IONS-SCNC: 6 MMOL/L (ref 6–16)
BASOPHILS # BLD AUTO: 0.02 K/MM3 (ref 0–0.23)
BASOPHILS NFR BLD AUTO: 0 % (ref 0–2)
BUN SERPL-MCNC: 20 MG/DL (ref 8–24)
CALCIUM SERPL-MCNC: 8.4 MG/DL (ref 8.5–10.1)
CHLORIDE SERPL-SCNC: 104 MMOL/L (ref 98–108)
CO2 SERPL-SCNC: 30 MMOL/L (ref 21–32)
CREAT SERPL-MCNC: 0.87 MG/DL (ref 0.4–1)
DEPRECATED RDW RBC AUTO: 48.4 FL (ref 35.1–46.3)
EOSINOPHIL # BLD AUTO: 0.17 K/MM3 (ref 0–0.68)
EOSINOPHIL NFR BLD AUTO: 2 % (ref 0–6)
ERYTHROCYTE [DISTWIDTH] IN BLOOD BY AUTOMATED COUNT: 13.9 % (ref 11.7–14.2)
GLUCOSE SERPL-MCNC: 124 MG/DL (ref 70–99)
HCT VFR BLD AUTO: 34.3 % (ref 33–51)
HGB BLD-MCNC: 11 G/DL (ref 11.5–16)
IMM GRANULOCYTES # BLD AUTO: 0.02 K/MM3 (ref 0–0.1)
IMM GRANULOCYTES NFR BLD AUTO: 0 % (ref 0–1)
LYMPHOCYTES # BLD AUTO: 0.79 K/MM3 (ref 0.84–5.2)
LYMPHOCYTES NFR BLD AUTO: 11 % (ref 21–46)
MCHC RBC AUTO-ENTMCNC: 32.1 G/DL (ref 31.5–36.5)
MCV RBC AUTO: 95 FL (ref 80–100)
MONOCYTES # BLD AUTO: 0.54 K/MM3 (ref 0.16–1.47)
MONOCYTES NFR BLD AUTO: 7 % (ref 4–13)
NEUTROPHILS # BLD AUTO: 5.87 K/MM3 (ref 1.96–9.15)
NEUTROPHILS NFR BLD AUTO: 79 % (ref 41–73)
NRBC # BLD AUTO: 0 K/MM3 (ref 0–0.02)
NRBC BLD AUTO-RTO: 0 /100 WBC (ref 0–0.2)
PLATELET # BLD AUTO: 194 K/MM3 (ref 150–400)
POTASSIUM SERPL-SCNC: 3.9 MMOL/L (ref 3.5–5.5)
PROTHROMBIN TIME: 14.7 SEC (ref 9.7–11.5)
SODIUM SERPL-SCNC: 140 MMOL/L (ref 136–145)

## 2018-11-16 ENCOUNTER — HOSPITAL ENCOUNTER (OUTPATIENT)
Dept: HOSPITAL 95 - LAB EV | Age: 83
Discharge: HOME | End: 2018-11-16
Attending: PHYSICIAN ASSISTANT
Payer: MEDICARE

## 2018-11-16 DIAGNOSIS — R60.0: Primary | ICD-10-CM

## 2018-11-16 LAB
ALBUMIN SERPL BCP-MCNC: 4 G/DL (ref 3.4–5)
ALBUMIN/GLOB SERPL: 1.1 {RATIO} (ref 0.8–1.8)
ALT SERPL W P-5'-P-CCNC: 26 U/L (ref 12–78)
ANION GAP SERPL CALCULATED.4IONS-SCNC: 10 MMOL/L (ref 6–16)
AST SERPL W P-5'-P-CCNC: 17 U/L (ref 12–37)
BASOPHILS # BLD AUTO: 0.01 K/MM3 (ref 0–0.23)
BASOPHILS NFR BLD AUTO: 0 % (ref 0–2)
BILIRUB SERPL-MCNC: 0.8 MG/DL (ref 0.1–1)
BUN SERPL-MCNC: 27 MG/DL (ref 8–24)
CALCIUM SERPL-MCNC: 8.9 MG/DL (ref 8.5–10.1)
CHLORIDE SERPL-SCNC: 102 MMOL/L (ref 98–108)
CO2 SERPL-SCNC: 27 MMOL/L (ref 21–32)
CREAT SERPL-MCNC: 1.06 MG/DL (ref 0.4–1)
DEPRECATED RDW RBC AUTO: 51.7 FL (ref 35.1–46.3)
EOSINOPHIL # BLD AUTO: 0.17 K/MM3 (ref 0–0.68)
EOSINOPHIL NFR BLD AUTO: 2 % (ref 0–6)
ERYTHROCYTE [DISTWIDTH] IN BLOOD BY AUTOMATED COUNT: 15.2 % (ref 11.7–14.2)
GLOBULIN SER CALC-MCNC: 3.8 G/DL (ref 2.2–4)
GLUCOSE SERPL-MCNC: 116 MG/DL (ref 70–99)
HCT VFR BLD AUTO: 34.1 % (ref 33–51)
HGB BLD-MCNC: 11.1 G/DL (ref 11.5–16)
IMM GRANULOCYTES # BLD AUTO: 0.03 K/MM3 (ref 0–0.1)
IMM GRANULOCYTES NFR BLD AUTO: 0 % (ref 0–1)
LYMPHOCYTES # BLD AUTO: 0.73 K/MM3 (ref 0.84–5.2)
LYMPHOCYTES NFR BLD AUTO: 8 % (ref 21–46)
MCHC RBC AUTO-ENTMCNC: 32.6 G/DL (ref 31.5–36.5)
MCV RBC AUTO: 92 FL (ref 80–100)
MONOCYTES # BLD AUTO: 0.49 K/MM3 (ref 0.16–1.47)
MONOCYTES NFR BLD AUTO: 5 % (ref 4–13)
NEUTROPHILS # BLD AUTO: 7.78 K/MM3 (ref 1.96–9.15)
NEUTROPHILS NFR BLD AUTO: 85 % (ref 41–73)
NRBC # BLD AUTO: 0 K/MM3 (ref 0–0.02)
NRBC BLD AUTO-RTO: 0 /100 WBC (ref 0–0.2)
PLATELET # BLD AUTO: 211 K/MM3 (ref 150–400)
POTASSIUM SERPL-SCNC: 4.1 MMOL/L (ref 3.5–5.5)
PROT SERPL-MCNC: 7.8 G/DL (ref 6.4–8.2)
SODIUM SERPL-SCNC: 139 MMOL/L (ref 136–145)

## 2018-12-21 ENCOUNTER — HOSPITAL ENCOUNTER (EMERGENCY)
Dept: HOSPITAL 95 - ER | Age: 83
LOS: 1 days | Discharge: HOME | End: 2018-12-22
Payer: MEDICARE

## 2018-12-21 VITALS — WEIGHT: 206.99 LBS | BODY MASS INDEX: 29.63 KG/M2 | HEIGHT: 70 IN

## 2018-12-21 DIAGNOSIS — F41.9: Primary | ICD-10-CM

## 2018-12-21 DIAGNOSIS — I48.91: ICD-10-CM

## 2018-12-21 DIAGNOSIS — Z88.0: ICD-10-CM

## 2018-12-21 DIAGNOSIS — Z88.2: ICD-10-CM

## 2018-12-21 DIAGNOSIS — Z79.899: ICD-10-CM

## 2018-12-21 DIAGNOSIS — Z88.1: ICD-10-CM

## 2018-12-21 DIAGNOSIS — Z88.8: ICD-10-CM

## 2018-12-21 LAB
BASOPHILS # BLD AUTO: 0.02 K/MM3 (ref 0–0.23)
BASOPHILS NFR BLD AUTO: 0 % (ref 0–2)
DEPRECATED RDW RBC AUTO: 50.1 FL (ref 35.1–46.3)
EOSINOPHIL # BLD AUTO: 0.19 K/MM3 (ref 0–0.68)
EOSINOPHIL NFR BLD AUTO: 3 % (ref 0–6)
ERYTHROCYTE [DISTWIDTH] IN BLOOD BY AUTOMATED COUNT: 15 % (ref 11.7–14.2)
HCT VFR BLD AUTO: 36 % (ref 33–51)
HGB BLD-MCNC: 11.5 G/DL (ref 11.5–16)
IMM GRANULOCYTES # BLD AUTO: 0.01 K/MM3 (ref 0–0.1)
IMM GRANULOCYTES NFR BLD AUTO: 0 % (ref 0–1)
LYMPHOCYTES # BLD AUTO: 1 K/MM3 (ref 0.84–5.2)
LYMPHOCYTES NFR BLD AUTO: 16 % (ref 21–46)
MCHC RBC AUTO-ENTMCNC: 31.9 G/DL (ref 31.5–36.5)
MCV RBC AUTO: 91 FL (ref 80–100)
MONOCYTES # BLD AUTO: 0.32 K/MM3 (ref 0.16–1.47)
MONOCYTES NFR BLD AUTO: 5 % (ref 4–13)
NEUTROPHILS # BLD AUTO: 4.59 K/MM3 (ref 1.96–9.15)
NEUTROPHILS NFR BLD AUTO: 75 % (ref 41–73)
NRBC # BLD AUTO: 0 K/MM3 (ref 0–0.02)
NRBC BLD AUTO-RTO: 0 /100 WBC (ref 0–0.2)
PLATELET # BLD AUTO: 206 K/MM3 (ref 150–400)

## 2018-12-22 LAB
ALBUMIN SERPL BCP-MCNC: 4.1 G/DL (ref 3.4–5)
ALBUMIN/GLOB SERPL: 1.1 {RATIO} (ref 0.8–1.8)
ALT SERPL W P-5'-P-CCNC: 24 U/L (ref 12–78)
ANION GAP SERPL CALCULATED.4IONS-SCNC: 9 MMOL/L (ref 6–16)
AST SERPL W P-5'-P-CCNC: 18 U/L (ref 12–37)
BILIRUB SERPL-MCNC: 0.4 MG/DL (ref 0.1–1)
BUN SERPL-MCNC: 28 MG/DL (ref 8–24)
CALCIUM SERPL-MCNC: 8.9 MG/DL (ref 8.5–10.1)
CHLORIDE SERPL-SCNC: 103 MMOL/L (ref 98–108)
CO2 SERPL-SCNC: 26 MMOL/L (ref 21–32)
CREAT SERPL-MCNC: 1 MG/DL (ref 0.4–1)
GLOBULIN SER CALC-MCNC: 3.8 G/DL (ref 2.2–4)
GLUCOSE SERPL-MCNC: 135 MG/DL (ref 70–99)
POTASSIUM SERPL-SCNC: 3.8 MMOL/L (ref 3.5–5.5)
PROT SERPL-MCNC: 7.9 G/DL (ref 6.4–8.2)
SODIUM SERPL-SCNC: 138 MMOL/L (ref 136–145)
TROPONIN I SERPL-MCNC: <0.015 NG/ML (ref 0–0.04)

## 2019-03-12 ENCOUNTER — HOSPITAL ENCOUNTER (OUTPATIENT)
Dept: HOSPITAL 95 - MHTC | Age: 84
Discharge: HOME | End: 2019-03-12
Attending: RADIOLOGY
Payer: MEDICARE

## 2019-03-12 VITALS — BODY MASS INDEX: 30.41 KG/M2 | HEIGHT: 68 IN | WEIGHT: 200.62 LBS

## 2019-03-12 DIAGNOSIS — Z86.73: ICD-10-CM

## 2019-03-12 DIAGNOSIS — Z88.8: ICD-10-CM

## 2019-03-12 DIAGNOSIS — Z86.14: ICD-10-CM

## 2019-03-12 DIAGNOSIS — I82.501: ICD-10-CM

## 2019-03-12 DIAGNOSIS — Z91.041: ICD-10-CM

## 2019-03-12 DIAGNOSIS — L97.929: ICD-10-CM

## 2019-03-12 DIAGNOSIS — Z79.01: ICD-10-CM

## 2019-03-12 DIAGNOSIS — I48.2: ICD-10-CM

## 2019-03-12 DIAGNOSIS — I87.2: Primary | ICD-10-CM

## 2019-03-12 DIAGNOSIS — Z88.0: ICD-10-CM

## 2019-03-12 DIAGNOSIS — Z88.1: ICD-10-CM

## 2019-03-12 DIAGNOSIS — Z88.2: ICD-10-CM

## 2019-03-12 NOTE — NUR
DISCHARGE INSTRUCTIONS REVIEWED ALL QUESTIONS ANSWERED. 20 G IV DISCONTINUED
FROM RIGHT FOREARM WITH INTACT CANNULA. LEFT LE NO BLEEDING, NO HEMATOMA. PT
ESCORTED OUT VIA WHEELCHAIR ESCORT.

## 2019-03-12 NOTE — NUR
LEFT LE TELFA PAD OVER WOUND ON LEFT ANKLE REGION FOLLOWED BY LEFT LE WRAPPED
WITH GAUZE, COBAN AND THIGH-HIGH 20-30 COMPRESSION STOCKING.

## 2019-08-22 NOTE — NUR
PT DRESSED, AMB TO BATHROOM WELL.  PRESENT. IV DC'D WITH CATH INTACT.
GROIN SITE STABLE. NO BLEEDING OR OOZING. R DP 2+. PT DC'D BY FAM.  AND
SON DRIVING PT HOME. VERBALIZES UNDERSTANDING OF SITE CARE DC
INSTRUCTIONS.

## 2020-06-18 NOTE — NUR
06/18/20 1654 LINSEY RICHARD
History, Chart, Medications and Allergies reviewed before start of
procedure. 3-LEAD EKG REVIEWED WITH PHYSICIAN PRIOR TO START OF
PROCEDURE. O2 VIA N/C INTACT THROUGHOUT SEDATION/PROCEDURE.  MONITOR
INTACT WITH CONTINUOUS PULSE OXIMETRY AND INTERMITTENT BP. PATIENT
DETERMINED TO BE ASA APPROPRIATE FOR PROPOFOL SEDATION PRIOR TO START
OF PROCEDURE BY DR. FERRER.

## 2020-06-18 NOTE — NUR
SHIFT SUMMARY
 
PATIENT SLEPT THROUGH NIGHT. DOES COMPLAIN OF SOME MILD PAIN WHEN TURNING,
RESOLVED WITH REST. INCONTINENT AT BEGIN OF SHIFT. HEMOGLOBIN = 5.9 THIS AM,
AWAITING CALL BACK FROM DR. AYOUB TO GET MORE BLOOD ORDERED. ASSESSMENT IS
AS TOLERATED. VSS. WILL CONTINUE TO MONITOR.

## 2020-06-18 NOTE — NUR
PATIENT ARRIVED TO ROOM FROM ENDOSCOPY ROOM. AWAKE, DENIES NAUSEA OR OTHER SX.
VSS. PATIENT TO HAVE REG DIET. CONT TO MONITOR.

## 2020-06-18 NOTE — NUR
TRANSFER OF CARE REPORT GIVEN TO LILLIAN CARRERA IN PCU. PT TO TRANSFER FROM DAY
SURGERY TO PCU. BELONGINGS TAKEN OVER TO PCU 11.

## 2020-06-18 NOTE — NUR
CONFIRMED WITH NOC LILLIAN RITCHIE OF BLOOD TRANSFUSION STOP TIME. CHARTED
STOPPED TIME ON 2ND UNIT PRBC FOR RN.

## 2020-06-19 NOTE — NUR
SPOKE AT LENGTH WITH PT SON. PT SON VERY CONCERNED RE PT H/H AND SOURCE OF
BLEEDING. PT SON WANTING MORE INFORMATION ABOUT CAMERA SCOPE THAT GI HAD
MENTIONED.

## 2020-06-19 NOTE — NUR
REPORT AND ROUNDS WITH EULOGIO SNYDER.
ASSUMED PT CARE. PT RESTING IN POSITION OF COMFORT. PT PLEASANT WITH
INTRODUCTIONS. CALL LIGHT/REMOTE IN REACH.

## 2020-06-19 NOTE — NUR
no acute changes this shift. she is getting up to the bsc to void, spouce has
been in to see her a few times today. call light in reach.

## 2020-06-19 NOTE — NUR
SHIFT SUMMARY
PT SLEEPING IN ROOM COMFORTABLY AT THIS TIME. NO ACUTE CHANGES IN STATUS T/O
NIGHT. PT HAS SERIAL H+H LABS DRAWN AFTER EGD, TRENDING DOWN. PROVIDER CALLED
WHEN HGB REACHED 7.0, PT TO RECIVE 1 UNIT PRBC'S. NO SIGNS OF BLEEDING NOTED.
PER REPORT GI CONSIDERING CAPSULE STUDY. PT IS VERY FORGETFUL W/ HX OF
DEMENTIA. VERY PLEASENT AFFECT BUT NEEDS REDIRECTION TO NOT PULL AT CORDS AND
WIRES. PT COMPLIENT ONCE RE-EDUCATED. RESP EVEN UNLABORED ON RA W/ SATS >92%.
PT DOES C/O RIB PAIN, REPORTS OLD RIB FX TO L SIDE. PAIN RESOLVED W/
REPOSITIONING. BRUISING NOTED TO BUE AND L LEG D/T RECENT FALL. PT DENIED CP
OR SOB DURING NIGHT. CALL LIGHT IN REACH. BED ALARM ON FOR SAFTEY.

## 2020-06-19 NOTE — NUR
pt laying in bed awake a/o to self, she is very forgetful, needs reorienting
frequently, is aware she is confused, states she is having trouble telling the
difference between a dream and reality, pleasant and cooperative with care,
follows commands well, denies pain, except her right shoulder is bothering
her, lungs are clear t/o, on r/a, resp even and unlabored, no cough noted,
hrirr, tele in place running afib per monitor, see strip, edema noted to rle,
2-3+, ppp+2, cap refill <3sec, vs stable, afebrile, iv site is clear and
patent to rfa, second iv on rfa was found sitting on top of the arm, completly
out, btx4, hypoactive, abd flat soft nontender, voids via bsc, skin has very
dark b/l le, left a bit worse, with a healing would to shin, not open, parker thakur, call light in reach, currently recieving a transfusion.

## 2020-06-19 NOTE — NUR
PT REMOVED TELE AND PULLED OUT IV.
DRESSING PLACED. TELE REAPPLIED. PT REORIENTED TO UNIT. WILL START NEW IV.

## 2020-06-19 NOTE — NUR
PT IN [employer] WATCHING TV. DR. FOY WAS IN TO SEE HER, V.S. STABLE. NO
CHANGES. CALL LIGHT IN REACH.

## 2020-06-20 NOTE — NUR
PT ARREIVED TO FLOOR FROM PCU POST RN REPORT. PCU RN REPOSRTE PT WITH DX GI
BLEED, AND HAD OVER THE PAST FEW DAYS ON PCU 4 UNITS OF PRBC'S. LATEST HGB WAS
7.9 AND THAT MD WAS AWARE. PRESENTS SELF AS CHEERFUL, BUT SOMEWHAT FORGETFUL.
ASSISSTED TO BED, BED ALARM ON. CALL LIGHT IN REACH.

## 2020-06-20 NOTE — NUR
ASSISTED PT TO BSC WITH ASSISTANCE FROM CNA. PT VOIDED, PULLUP CHANGED.
ASSISTED PT BACK TO BED. REMOTE/CALL LIGHT IN REACH. LIGHT DIMMED. MUSIC
CHANNEL ON INSTEAD OF NEWS. PT CONFUSED. CONTINUES TO ASK ABOUT FAMILY AND
WHETHER OR NOT THEY ARE DEAD. EXPLAINED TO PT THAT THIS RN SPOKE WITH PT SON
THIS EVENING AND THAT PT  SAT WITH HER YEST.
PT EASILY REDIRECTED. REMAINS CONFUSED TO SITUATION.

## 2020-06-20 NOTE — NUR
PT MEDICATED WITH SYNTHROID AND PROTONIX PER EMAR. KAI PO WELL. SITTING UP IN
BED. CALL LIGHT IN REACH. C/O PAIN TO LEFT CHEST/BREAST AREA.

## 2020-06-20 NOTE — NUR
PT UP TO CHAIR FOR BREAKFAST, ALERT, PROFOUNDLY FORGETFUL BUT REDIRECTS, IS
PLEASANT AND COOPERATIVE WITH CARE, FOLLOWS COMMANDS WELL, DENIES PAIN, LUNGS
ARE CLEAR IN UPPER FIELDS, VERY FAINT/FINE CRACKLES TO BASES, RESP EVEN AND
UNLABORED, NO COUGH NOTED, HRIRR, TELE IN PLACE RUNNING AFIB PER MONITOR, SEE
STRIP, EDEMA NOTED TO B/L LE, RIGHT LEG IS LARGER THAN LEFT, PPP+1, CAP REFILL
<3SEC, VS STABLE, AFEBRILE, IV SITE IS CLEAR AND PATENT, BTX4, ABD FLAT SOFT
NONTENDER, VOIDS WITHOUT DIFF, SKIN C/W/D, MAEW, JESI, CALL LIGHT IN REACH.

## 2020-06-20 NOTE — NUR
SHIFT SUMMARY
 
PT REMAINED CONFUSED AND PLEASANT THROUGHOUT SHIFT. VSS. PT USES BSC WHEN
OFFERED. NEW IV TO LEFT FA PLACED LAST NIGHT DUE TO PT PULLING IT OUT LAST
NIGHT. PT DENIED PAIN, DENIES SOB. RIGHT LEG SWOLLEN. PT USES WALKER WHEN
PROMPTED. PT NEEDS LOTS OF REASSURANCE AND REORIENTATION. USES CALL LIGHT
SOMETIMES.
WILL CONTINUE TO MONITOR AND REPORT TO DAY SHIFT.

## 2020-06-20 NOTE — NUR
Moderate assistance from bed to bathroom to void.  She is able to walk using
the walker and with direction.  Gait belt used for safety as the pt states her
knees "buckle' sometimes.  Her son is here at this time.
Assisted to chair with chair alarm in  place for breakfast.

## 2020-06-21 NOTE — NUR
PT PLEASANT COOP A/O X2, AWARE OF FAMILY, SELF, , MONTH AND YEAR. STATES IN
[place], [facility], [place] OR. FORGETS DETAILS. OFTEN TALKS IN LOOP
REPEATING SAME QUESTIONS OR CONCERNS. NOW CONCERNED ABOUT GETTING UP 15 STEPS
AND GOING HOME. STATES PAIN IN RIBS. SPOKE TO DR RAM PAIN, AND POSS HOME FALL,
DR STATES NOT AWARE OF A HOME FALL. PT STATES PRESIDENT IS FORD. MED FOR PAIN.
PER EMAR. H/R IRREG, NO MURMER NOTED. PER TELE AFIB WITH BBB RATE ABOUT 100.
LUNGS CLEAR , RESP EASY, UNLABORED. ON R/A. BT X 4 LAST BM NOT KNOWN BY PT.
VOIDS CONT/INCONT. 1 ASST WITH FWW. BED IN LOW POSITION, CALL LITE IN REACH,
BED ALARM ON FOR SAFETY.

## 2020-06-21 NOTE — NUR
PT VOICED "FEELS LIKE A BAND AROUND MY CHEST WHEN I BREATHE". VSS. SATS 93-94%
ON ROOM AIR. NO NOTED CLOTHING, ETC, TIGHT ON CHEST. HOB ELEVATED. CALL LIGHT
IN REACH. CALL PLACED TO ON CALL. ORDERS RECEIVED FOR TROP LEVEL, AND EKG,
ONCE COMPLETED TO CALL BACK ON CALL FOR ER TO SEE PT.

## 2020-06-21 NOTE — NUR
ON CALL NOTIFIED OF TROP LEVEL (0.015 AND EKG OF A FIB WITH BBB). ON CALL
ORDERS RT TO FOLLOW UP AND TREAT. RT NOTIFIED. CALL LIGHT IN PT REACH

## 2020-06-21 NOTE — NUR
12 LEAD EKG DONE, A FIB WITH BBB. PT HAS HAD THIS REPORTED OFF BY AM NURSE.
TROP LEVEL DRAWN, WAITING FOR RESULTS. REPOSITIONED FOR COMFORT. AWAITING TROP
LEVEL AND WILL CALL ON CALL BACK AS ORDERED

## 2020-06-21 NOTE — NUR
PT PLEASANTLY CONFUSED.  IN TO SEE HER TODAY. HE ALSO CONFUSED. EACH
USING LOOP REASONING TO GET THEIR POINT ACROSS. REFUSING TO LET THE OTHER
EXPRESS THEIR THOUGHTS WITHOUT INTERUPTION. EVENTUALLY, THEY DID CALM DOWN
SOME. WHEN DISCUSSING HER GETTING BACK TO HER BASELINE, HE BEGAN TO INTERUPT
HER AGAIN, SHE INTERUPTED HIM. EMPHATICALLY STATES TO HIM SHE WILL NEVER START
HER PERIOD AGAIN. I EXPLAINED WAS REFERENCING FROM GI BLEED AND HER AMBULATORY
BASELINE . THEN SHE WANT BACK TO DISCUSSING POLIO FROM HER CHILDHOOD. TRIED TO
REDIRECT, BUT TO NO AVAIL. PT CONTINUES TO BE PLEASANTLY CONFUSED. NO NEW
CONCERNS AT THIS TIME.  BED IN LOW POSITION, CALL LITE IN REACH, BED ALARM ON
FOR SAFETY

## 2020-06-21 NOTE — NUR
SHIFT SUMMARY
 
HAS BEEN RESTING QUIETLY WITH FEW INTERRUPTIONS THIS SHIFT. BED ALARM REMAINS
ON FOR SAFETY AND CALL LIGHT IN REACH.

## 2020-06-22 NOTE — NUR
PATIENT DISCHARGE:
PATIENT DISCHARGED TO HOME / XFR TO HOME HEALTH THIS SHIFT. MEDICATION
RECONCILIATION COMPLETED; MED LIST FAXED TO [facility]. DISCHARGE EDUCATION
COMPLETED WITH PATIENT AND SON. PATIENT TRANSPORTED TO EXIT BY [facility] STAFF WITH
WHEELCHAIR AT 1730. PATIENT DEPARTED [facility] VIA AMBULANCE.

## 2020-06-22 NOTE — NUR
PT AWAKE AT INTERVALS TALKING OUT LOUD. UPON ARRIVING AT BEDSIDE, PT ALONE,
NOT TALKING ON PHONE. VOICED PAIN OF RIGHT SIDE OF RIBCAGE, BUT NO COMPLAINTS
OF DIFFICULTY BREATHING. AFFECT CHEERFUL AFTER TALKING WITH RN. HOB ELEVATED.
RT HAD GIVEN TREATMENT EARLIER - SEE RT NOTATIONS. CALL LIGHT IN REACH. WILL
CONTINUE TO MONITOR.

## 2020-06-22 NOTE — NUR
SHIFT LARRY
 
RESTING QUIETLY AT THIS TIME, EARLIER PT SOUNDED AS IF SHE WAS ARGUING WITH
SOMEONE, BUT UPON ENTERING ROOM, WAS BY SELF AND NOT ON THE PHONE. EARLIER IN
THE SHIFT, VOICED DIFFICULTY WITH BREATHING, VOICED FEELING SHE FELT LIKE SHE
HAD A "BAND" RESTRICTING HER RESPS AROUND HER CHEST. TROP WNL, 12 LEAD EKG A
FIB WITH BBB. ON CALL ORDERED RT TO EVAL. RT ASSESSED AND GAVE RESP TREATMENT.
LATER STILL, VOICED SOME DISCOMFORT OF RIGHT LOWER RIB CAGE AREA. NO NOTED
ANOMALY OF AREA. VSS. NURSE PROVIDED ENCOURAGEMENT AND TIME TO LISTEN.
APPARENTLY INTERVENTIONS EFFECTIVE, AS PT RESTING QUIETLY AT THIS TIME. CALL
LIGHT IN REACH.